# Patient Record
(demographics unavailable — no encounter records)

---

## 2025-04-23 NOTE — REVIEW OF SYSTEMS
[de-identified] : as per HPI  [de-identified] : as per HPI  [de-identified] : as per HPI  [FreeTextEntry2] : as per HPI

## 2025-04-23 NOTE — HISTORY OF PRESENT ILLNESS
[No Personal or Family History of Easy Bruising, Bleeding, or Issues with General Anesthesia] : No Personal or Family History of easy bruising, bleeding, or issues with general anesthesia [de-identified] : 22 month old boy referred by PCP for initial evaluation for Right parotid mass since 4/3/2025 that has gotten a bit bigger over time.  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 Contrast enhanced CT or MRI of the head and neck is recommended for full evaluation and staging. History of bilateral parotid swelling 3 months ago  Reports frequent pulling/tugging at both ears. Denies otorrhea, recent fevers or ear infections.  No parental concerns with hearing. No throat/tonsil infections.  Mild nasal congestion - not using nasal sprays or OTC allergy medication.  No snoring.

## 2025-04-23 NOTE — CONSULT LETTER
[Dear  ___] : Dear ~MARIA LUZ, [Consult Letter:] : I had the pleasure of evaluating your patient, [unfilled]. [Please see my note below.] : Please see my note below. [Consult Closing:] : Thank you very much for allowing me to participate in the care of this patient.  If you have any questions, please do not hesitate to contact me. [Sincerely,] : Sincerely, [FreeTextEntry2] : YURY CARDENAS [address] [id number] [FreeTextEntry3] : Devonte Alvarez MD Pediatric Otolaryngology [address] [address] Tel [phone] Fax [phone]

## 2025-04-23 NOTE — BIRTH HISTORY
[At ___ Weeks Gestation] : at [unfilled] weeks gestation [Normal Vaginal Route] : by normal vaginal route [None] : No maternal complications [Passed] : passed [de-identified] : NICU stay for 2 days, never intubated

## 2025-04-23 NOTE — PHYSICAL EXAM
[Exposed Vessel] : left anterior vessel not exposed [Clear to Auscultation] : lungs were clear to auscultation bilaterally [Wheezing] : no wheezing [Increased Work of Breathing] : no increased work of breathing with use of accessory muscles and retractions [Normal Gait and Station] : normal gait and station [Normal muscle strength, symmetry and tone of facial, head and neck musculature] : normal muscle strength, symmetry and tone of facial, head and neck musculature [Normal] : no cervical lymphadenopathy [de-identified] : right preauricular mass , 1.5-2 cm , periparotid, nonmobile

## 2025-04-23 NOTE — DATA REVIEWED
[FreeTextEntry1] : 1. history obtained from primary caregiver as independent historian due to patient's developmental age and limited recall  2. Referral documents reviewed  3. External ultrasound results reviewed  4. External notes (brought by parent ) reviewed

## 2025-04-23 NOTE — HISTORY OF PRESENT ILLNESS
[No Personal or Family History of Easy Bruising, Bleeding, or Issues with General Anesthesia] : No Personal or Family History of easy bruising, bleeding, or issues with general anesthesia [de-identified] : 22 month old boy referred by PCP for initial evaluation for Right parotid mass since 4/3/2025 that has gotten a bit bigger over time.  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 Contrast enhanced CT or MRI of the head and neck is recommended for full evaluation and staging. History of bilateral parotid swelling 3 months ago  Reports frequent pulling/tugging at both ears. Denies otorrhea, recent fevers or ear infections.  No parental concerns with hearing. No throat/tonsil infections.  Mild nasal congestion - not using nasal sprays or OTC allergy medication.  No snoring.

## 2025-04-23 NOTE — BIRTH HISTORY
[At ___ Weeks Gestation] : at [unfilled] weeks gestation [Normal Vaginal Route] : by normal vaginal route [None] : No maternal complications [Passed] : passed [de-identified] : NICU stay for 2 days, never intubated

## 2025-04-23 NOTE — PHYSICAL EXAM
[Exposed Vessel] : left anterior vessel not exposed [Clear to Auscultation] : lungs were clear to auscultation bilaterally [Wheezing] : no wheezing [Increased Work of Breathing] : no increased work of breathing with use of accessory muscles and retractions [Normal Gait and Station] : normal gait and station [Normal muscle strength, symmetry and tone of facial, head and neck musculature] : normal muscle strength, symmetry and tone of facial, head and neck musculature [Normal] : no cervical lymphadenopathy [de-identified] : right preauricular mass , 1.5-2 cm , periparotid, nonmobile

## 2025-04-23 NOTE — CONSULT LETTER
[Dear  ___] : Dear ~MARIA LUZ, [Consult Letter:] : I had the pleasure of evaluating your patient, [unfilled]. [Please see my note below.] : Please see my note below. [Consult Closing:] : Thank you very much for allowing me to participate in the care of this patient.  If you have any questions, please do not hesitate to contact me. [Sincerely,] : Sincerely, [FreeTextEntry2] : YURY CARDENAS [address] [id number] [FreeTextEntry3] : Devonte Alvarez MD Pediatric Otolaryngology [address] [address] Tel [phone] Fax [phone]

## 2025-04-23 NOTE — REVIEW OF SYSTEMS
[de-identified] : as per HPI  [de-identified] : as per HPI  [de-identified] : as per HPI  [FreeTextEntry2] : as per HPI